# Patient Record
Sex: MALE | Race: WHITE | NOT HISPANIC OR LATINO
[De-identification: names, ages, dates, MRNs, and addresses within clinical notes are randomized per-mention and may not be internally consistent; named-entity substitution may affect disease eponyms.]

---

## 2017-03-15 ENCOUNTER — APPOINTMENT (OUTPATIENT)
Dept: CARDIOLOGY | Facility: CLINIC | Age: 47
End: 2017-03-15

## 2017-09-22 ENCOUNTER — RX RENEWAL (OUTPATIENT)
Age: 47
End: 2017-09-22

## 2017-11-07 ENCOUNTER — APPOINTMENT (OUTPATIENT)
Dept: FAMILY MEDICINE | Facility: CLINIC | Age: 47
End: 2017-11-07
Payer: COMMERCIAL

## 2017-11-07 VITALS
SYSTOLIC BLOOD PRESSURE: 140 MMHG | OXYGEN SATURATION: 99 % | WEIGHT: 190 LBS | RESPIRATION RATE: 18 BRPM | BODY MASS INDEX: 28.14 KG/M2 | DIASTOLIC BLOOD PRESSURE: 104 MMHG | HEIGHT: 69 IN | HEART RATE: 79 BPM | TEMPERATURE: 98.8 F

## 2017-11-07 DIAGNOSIS — Z23 ENCOUNTER FOR IMMUNIZATION: ICD-10-CM

## 2017-11-07 PROCEDURE — 36415 COLL VENOUS BLD VENIPUNCTURE: CPT

## 2017-11-07 PROCEDURE — G0008: CPT

## 2017-11-07 PROCEDURE — 90688 IIV4 VACCINE SPLT 0.5 ML IM: CPT

## 2017-11-07 PROCEDURE — 99214 OFFICE O/P EST MOD 30 MIN: CPT | Mod: 25

## 2017-11-08 LAB
ALBUMIN SERPL ELPH-MCNC: 4.6 G/DL
ALP BLD-CCNC: 61 U/L
ALT SERPL-CCNC: 29 U/L
ANION GAP SERPL CALC-SCNC: 15 MMOL/L
AST SERPL-CCNC: 17 U/L
BASOPHILS # BLD AUTO: 0.03 K/UL
BASOPHILS NFR BLD AUTO: 0.5 %
BILIRUB SERPL-MCNC: 1.4 MG/DL
BUN SERPL-MCNC: 15 MG/DL
CALCIUM SERPL-MCNC: 9.3 MG/DL
CHLORIDE SERPL-SCNC: 103 MMOL/L
CHOLEST SERPL-MCNC: 150 MG/DL
CHOLEST/HDLC SERPL: 3 RATIO
CO2 SERPL-SCNC: 24 MMOL/L
CREAT SERPL-MCNC: 1.03 MG/DL
EOSINOPHIL # BLD AUTO: 0.06 K/UL
EOSINOPHIL NFR BLD AUTO: 1 %
GLUCOSE SERPL-MCNC: 97 MG/DL
HBA1C MFR BLD HPLC: 4.2 %
HCT VFR BLD CALC: 44.6 %
HDLC SERPL-MCNC: 50 MG/DL
HGB BLD-MCNC: 16.1 G/DL
IMM GRANULOCYTES NFR BLD AUTO: 0.3 %
LDLC SERPL CALC-MCNC: 84 MG/DL
LYMPHOCYTES # BLD AUTO: 1.81 K/UL
LYMPHOCYTES NFR BLD AUTO: 30.4 %
MAN DIFF?: NORMAL
MCHC RBC-ENTMCNC: 31.8 PG
MCHC RBC-ENTMCNC: 36.1 GM/DL
MCV RBC AUTO: 88 FL
MONOCYTES # BLD AUTO: 0.41 K/UL
MONOCYTES NFR BLD AUTO: 6.9 %
NEUTROPHILS # BLD AUTO: 3.62 K/UL
NEUTROPHILS NFR BLD AUTO: 60.9 %
PLATELET # BLD AUTO: 241 K/UL
POTASSIUM SERPL-SCNC: 4.2 MMOL/L
PROT SERPL-MCNC: 7.2 G/DL
RBC # BLD: 5.07 M/UL
RBC # FLD: 13.8 %
SODIUM SERPL-SCNC: 142 MMOL/L
TRIGL SERPL-MCNC: 78 MG/DL
TSH SERPL-ACNC: 1.73 UIU/ML
WBC # FLD AUTO: 5.95 K/UL

## 2018-05-05 ENCOUNTER — RX RENEWAL (OUTPATIENT)
Age: 48
End: 2018-05-05

## 2018-07-03 ENCOUNTER — APPOINTMENT (OUTPATIENT)
Dept: FAMILY MEDICINE | Facility: CLINIC | Age: 48
End: 2018-07-03
Payer: COMMERCIAL

## 2018-07-03 VITALS
BODY MASS INDEX: 28.88 KG/M2 | DIASTOLIC BLOOD PRESSURE: 84 MMHG | HEIGHT: 69 IN | SYSTOLIC BLOOD PRESSURE: 130 MMHG | WEIGHT: 195 LBS

## 2018-07-03 PROCEDURE — 36415 COLL VENOUS BLD VENIPUNCTURE: CPT

## 2018-07-03 PROCEDURE — 99214 OFFICE O/P EST MOD 30 MIN: CPT | Mod: 25

## 2018-07-04 LAB
ALBUMIN SERPL ELPH-MCNC: 4.7 G/DL
ALP BLD-CCNC: 58 U/L
ALT SERPL-CCNC: 44 U/L
ANION GAP SERPL CALC-SCNC: 12 MMOL/L
AST SERPL-CCNC: 24 U/L
BILIRUB SERPL-MCNC: 1.4 MG/DL
BUN SERPL-MCNC: 16 MG/DL
CALCIUM SERPL-MCNC: 9.5 MG/DL
CHLORIDE SERPL-SCNC: 104 MMOL/L
CO2 SERPL-SCNC: 25 MMOL/L
CREAT SERPL-MCNC: 1.06 MG/DL
GLUCOSE SERPL-MCNC: 78 MG/DL
POTASSIUM SERPL-SCNC: 4.3 MMOL/L
PROT SERPL-MCNC: 7.2 G/DL
SODIUM SERPL-SCNC: 141 MMOL/L

## 2018-12-05 ENCOUNTER — APPOINTMENT (OUTPATIENT)
Dept: OTOLARYNGOLOGY | Facility: CLINIC | Age: 48
End: 2018-12-05
Payer: COMMERCIAL

## 2018-12-05 VITALS
HEIGHT: 69 IN | HEART RATE: 70 BPM | SYSTOLIC BLOOD PRESSURE: 152 MMHG | DIASTOLIC BLOOD PRESSURE: 101 MMHG | BODY MASS INDEX: 28.88 KG/M2 | WEIGHT: 195 LBS

## 2018-12-05 DIAGNOSIS — Z82.49 FAMILY HISTORY OF ISCHEMIC HEART DISEASE AND OTHER DISEASES OF THE CIRCULATORY SYSTEM: ICD-10-CM

## 2018-12-05 DIAGNOSIS — Z86.79 PERSONAL HISTORY OF OTHER DISEASES OF THE CIRCULATORY SYSTEM: ICD-10-CM

## 2018-12-05 DIAGNOSIS — Z80.9 FAMILY HISTORY OF MALIGNANT NEOPLASM, UNSPECIFIED: ICD-10-CM

## 2018-12-05 DIAGNOSIS — Z78.9 OTHER SPECIFIED HEALTH STATUS: ICD-10-CM

## 2018-12-05 DIAGNOSIS — J32.9 CHRONIC SINUSITIS, UNSPECIFIED: ICD-10-CM

## 2018-12-05 DIAGNOSIS — H93.12 TINNITUS, LEFT EAR: ICD-10-CM

## 2018-12-05 DIAGNOSIS — Z83.3 FAMILY HISTORY OF DIABETES MELLITUS: ICD-10-CM

## 2018-12-05 PROCEDURE — 99204 OFFICE O/P NEW MOD 45 MIN: CPT | Mod: 25,57

## 2018-12-10 ENCOUNTER — APPOINTMENT (OUTPATIENT)
Dept: OTOLARYNGOLOGY | Facility: CLINIC | Age: 48
End: 2018-12-10
Payer: COMMERCIAL

## 2018-12-10 VITALS
HEART RATE: 79 BPM | HEIGHT: 69 IN | SYSTOLIC BLOOD PRESSURE: 156 MMHG | WEIGHT: 195 LBS | DIASTOLIC BLOOD PRESSURE: 102 MMHG | BODY MASS INDEX: 28.88 KG/M2

## 2018-12-10 DIAGNOSIS — H91.92 UNSPECIFIED HEARING LOSS, LEFT EAR: ICD-10-CM

## 2018-12-10 PROCEDURE — 99214 OFFICE O/P EST MOD 30 MIN: CPT | Mod: 25

## 2018-12-10 PROCEDURE — 92550 TYMPANOMETRY & REFLEX THRESH: CPT

## 2018-12-10 PROCEDURE — 92557 COMPREHENSIVE HEARING TEST: CPT

## 2018-12-10 RX ORDER — PROMETHAZINE HYDROCHLORIDE AND DEXTROMETHORPHAN HYDROBROMIDE ORAL SOLUTION 15; 6.25 MG/5ML; MG/5ML
6.25-15 SOLUTION ORAL
Qty: 240 | Refills: 0 | Status: DISCONTINUED | COMMUNITY
Start: 2018-07-03 | End: 2018-12-10

## 2018-12-27 ENCOUNTER — MEDICATION RENEWAL (OUTPATIENT)
Age: 48
End: 2018-12-27

## 2018-12-31 ENCOUNTER — APPOINTMENT (OUTPATIENT)
Dept: FAMILY MEDICINE | Facility: CLINIC | Age: 48
End: 2018-12-31
Payer: COMMERCIAL

## 2018-12-31 VITALS
BODY MASS INDEX: 28.88 KG/M2 | OXYGEN SATURATION: 98 % | WEIGHT: 195 LBS | SYSTOLIC BLOOD PRESSURE: 140 MMHG | HEIGHT: 69 IN | HEART RATE: 67 BPM | RESPIRATION RATE: 16 BRPM | DIASTOLIC BLOOD PRESSURE: 90 MMHG

## 2018-12-31 DIAGNOSIS — I10 ESSENTIAL (PRIMARY) HYPERTENSION: ICD-10-CM

## 2018-12-31 DIAGNOSIS — Z00.00 ENCOUNTER FOR GENERAL ADULT MEDICAL EXAMINATION W/OUT ABNORMAL FINDINGS: ICD-10-CM

## 2018-12-31 DIAGNOSIS — R00.2 PALPITATIONS: ICD-10-CM

## 2018-12-31 PROCEDURE — 99213 OFFICE O/P EST LOW 20 MIN: CPT | Mod: 25

## 2018-12-31 PROCEDURE — 93000 ELECTROCARDIOGRAM COMPLETE: CPT

## 2018-12-31 PROCEDURE — 36415 COLL VENOUS BLD VENIPUNCTURE: CPT

## 2018-12-31 NOTE — PHYSICAL EXAM
[No Acute Distress] : no acute distress [Well Nourished] : well nourished [Well Developed] : well developed [Well-Appearing] : well-appearing [Normal Sclera/Conjunctiva] : normal sclera/conjunctiva [PERRL] : pupils equal round and reactive to light [EOMI] : extraocular movements intact [Normal Oropharynx] : the oropharynx was normal [No Respiratory Distress] : no respiratory distress  [Clear to Auscultation] : lungs were clear to auscultation bilaterally [No Accessory Muscle Use] : no accessory muscle use [Normal Rate] : normal rate  [Regular Rhythm] : with a regular rhythm [Normal S1, S2] : normal S1 and S2 [No Edema] : there was no peripheral edema [No Extremity Clubbing/Cyanosis] : no extremity clubbing/cyanosis [Soft] : abdomen soft [Non Tender] : non-tender [Non-distended] : non-distended [No Masses] : no abdominal mass palpated [Normal Bowel Sounds] : normal bowel sounds [Normal Affect] : the affect was normal [Alert and Oriented x3] : oriented to person, place, and time [Normal Mood] : the mood was normal [de-identified] : CN II-XII intact

## 2018-12-31 NOTE — HISTORY OF PRESENT ILLNESS
[FreeTextEntry1] : Here for follow-up  [de-identified] : Here for follow-up and bloodwork. \par Has been taking Losartan without any problems. \par \par Has been having headaches.  Relieved by Aspirin.  \par \par Averaging 5-6 hours of sleep.  Work schedule shifts frequently and sleep cycle changes. \par Also notes he has been having some episodes of palpitations at rest.  Not related to activity.  No chest pain or difficulty breathing. \par

## 2018-12-31 NOTE — PLAN
[FreeTextEntry1] : \par \par EKG reviewed-similar to prior.  Patient currently stable and asymptomatic.  Advised follow-up with cardiologist.  \par \par Asked patient to keep BP log at home, may require medication adjustment.  Elevated readings at specialist's office.\par Discussed normal BP levels.   Headache may be related to change in BP, low blood sugar, change in sleep habits. Currently stable, normal neuro exam.  Will follow-up labwork. Discussed with Mr. GONSALVES precautions and advised to go to Emergency Room if condition worsened, developed chest pain or shortness of breath.\par \par  SHIV GONSALVES expressed understanding of the plan.\par

## 2019-01-02 ENCOUNTER — OTHER (OUTPATIENT)
Age: 49
End: 2019-01-02

## 2019-01-03 LAB
ALBUMIN SERPL ELPH-MCNC: 4.5 G/DL
ALP BLD-CCNC: 68 U/L
ALT SERPL-CCNC: 45 U/L
ANION GAP SERPL CALC-SCNC: 13 MMOL/L
AST SERPL-CCNC: 20 U/L
BASOPHILS # BLD AUTO: 0.03 K/UL
BASOPHILS NFR BLD AUTO: 0.5 %
BILIRUB SERPL-MCNC: 0.9 MG/DL
BUN SERPL-MCNC: 14 MG/DL
CALCIUM SERPL-MCNC: 9.2 MG/DL
CHLORIDE SERPL-SCNC: 107 MMOL/L
CO2 SERPL-SCNC: 23 MMOL/L
CREAT SERPL-MCNC: 1.04 MG/DL
EOSINOPHIL # BLD AUTO: 0.09 K/UL
EOSINOPHIL NFR BLD AUTO: 1.4 %
GLUCOSE SERPL-MCNC: 86 MG/DL
HCT VFR BLD CALC: 43.2 %
HGB BLD-MCNC: 16 G/DL
IMM GRANULOCYTES NFR BLD AUTO: 0 %
LYMPHOCYTES # BLD AUTO: 2.06 K/UL
LYMPHOCYTES NFR BLD AUTO: 32.3 %
MAN DIFF?: NORMAL
MCHC RBC-ENTMCNC: 31.2 PG
MCHC RBC-ENTMCNC: 37 GM/DL
MCV RBC AUTO: 84.2 FL
MONOCYTES # BLD AUTO: 0.41 K/UL
MONOCYTES NFR BLD AUTO: 6.4 %
NEUTROPHILS # BLD AUTO: 3.79 K/UL
NEUTROPHILS NFR BLD AUTO: 59.4 %
PLATELET # BLD AUTO: 270 K/UL
POTASSIUM SERPL-SCNC: 4.4 MMOL/L
PROT SERPL-MCNC: 7.1 G/DL
RBC # BLD: 5.13 M/UL
RBC # FLD: 13.4 %
SODIUM SERPL-SCNC: 143 MMOL/L
TSH SERPL-ACNC: 2.53 UIU/ML
WBC # FLD AUTO: 6.38 K/UL

## 2019-02-04 ENCOUNTER — OUTPATIENT (OUTPATIENT)
Dept: OUTPATIENT SERVICES | Facility: HOSPITAL | Age: 49
LOS: 1 days | End: 2019-02-04
Payer: COMMERCIAL

## 2019-02-04 VITALS
DIASTOLIC BLOOD PRESSURE: 104 MMHG | HEIGHT: 68 IN | TEMPERATURE: 98 F | SYSTOLIC BLOOD PRESSURE: 150 MMHG | HEART RATE: 89 BPM | WEIGHT: 195.11 LBS | RESPIRATION RATE: 14 BRPM

## 2019-02-04 DIAGNOSIS — M71.9 BURSOPATHY, UNSPECIFIED: Chronic | ICD-10-CM

## 2019-02-04 DIAGNOSIS — H90.6 MIXED CONDUCTIVE AND SENSORINEURAL HEARING LOSS, BILATERAL: ICD-10-CM

## 2019-02-04 DIAGNOSIS — H90.8 MIXED CONDUCTIVE AND SENSORINEURAL HEARING LOSS, UNSPECIFIED: ICD-10-CM

## 2019-02-04 DIAGNOSIS — I10 ESSENTIAL (PRIMARY) HYPERTENSION: ICD-10-CM

## 2019-02-04 LAB
ANION GAP SERPL CALC-SCNC: 17 MMO/L — HIGH (ref 7–14)
BUN SERPL-MCNC: 18 MG/DL — SIGNIFICANT CHANGE UP (ref 7–23)
CALCIUM SERPL-MCNC: 9.5 MG/DL — SIGNIFICANT CHANGE UP (ref 8.4–10.5)
CHLORIDE SERPL-SCNC: 104 MMOL/L — SIGNIFICANT CHANGE UP (ref 98–107)
CO2 SERPL-SCNC: 22 MMOL/L — SIGNIFICANT CHANGE UP (ref 22–31)
CREAT SERPL-MCNC: 1.09 MG/DL — SIGNIFICANT CHANGE UP (ref 0.5–1.3)
GLUCOSE SERPL-MCNC: 58 MG/DL — LOW (ref 70–99)
HCT VFR BLD CALC: 42.1 % — SIGNIFICANT CHANGE UP (ref 39–50)
HGB BLD-MCNC: 14 G/DL — SIGNIFICANT CHANGE UP (ref 13–17)
MCHC RBC-ENTMCNC: 28.6 PG — SIGNIFICANT CHANGE UP (ref 27–34)
MCHC RBC-ENTMCNC: 33.2 % — SIGNIFICANT CHANGE UP (ref 32–36)
MCV RBC AUTO: 85.4 FL — SIGNIFICANT CHANGE UP (ref 80–100)
NRBC # FLD: 0 K/UL — LOW (ref 25–125)
PLATELET # BLD AUTO: 280 K/UL — SIGNIFICANT CHANGE UP (ref 150–400)
PMV BLD: 10.9 FL — SIGNIFICANT CHANGE UP (ref 7–13)
POTASSIUM SERPL-MCNC: 3.6 MMOL/L — SIGNIFICANT CHANGE UP (ref 3.5–5.3)
POTASSIUM SERPL-SCNC: 3.6 MMOL/L — SIGNIFICANT CHANGE UP (ref 3.5–5.3)
RBC # BLD: 4.93 M/UL — SIGNIFICANT CHANGE UP (ref 4.2–5.8)
RBC # FLD: 12.7 % — SIGNIFICANT CHANGE UP (ref 10.3–14.5)
SODIUM SERPL-SCNC: 143 MMOL/L — SIGNIFICANT CHANGE UP (ref 135–145)
WBC # BLD: 9.04 K/UL — SIGNIFICANT CHANGE UP (ref 3.8–10.5)
WBC # FLD AUTO: 9.04 K/UL — SIGNIFICANT CHANGE UP (ref 3.8–10.5)

## 2019-02-04 PROCEDURE — 93010 ELECTROCARDIOGRAM REPORT: CPT

## 2019-02-04 RX ORDER — LOSARTAN POTASSIUM 100 MG/1
1 TABLET, FILM COATED ORAL
Qty: 0 | Refills: 0 | COMMUNITY

## 2019-02-04 RX ORDER — AMLODIPINE AND VALSARTAN 5; 320 MG/1; MG/1
1 TABLET, FILM COATED ORAL
Qty: 0 | Refills: 0 | COMMUNITY

## 2019-02-04 NOTE — H&P PST ADULT - CARDIOVASCULAR COMMENTS
hx of palpitations since 2017 not associated with activity, last episode 1 months ago, lasting a second was evaluated by cardiology reports no change in symptoms

## 2019-02-04 NOTE — H&P PST ADULT - GASTROINTESTINAL DETAILS
no guarding/no masses palpable/no bruit/no distention/soft/bowel sounds normal/no rebound tenderness/nontender/no rigidity/no organomegaly

## 2019-02-04 NOTE — H&P PST ADULT - CARDIOVASCULAR SYMPTOMS
palpitations palpitations/when seen by cardiologist 1/7/2019 medications were changed from losartan 100mg daily to amlodipine- valsartan 5/320mg, was using up losartan before starting amlodipine- valsartan  will start new medication today

## 2019-02-04 NOTE — H&P PST ADULT - NEGATIVE CARDIOVASCULAR SYMPTOMS
no paroxysmal nocturnal dyspnea/no claudication/no peripheral edema/no dyspnea on exertion/no chest pain/no orthopnea

## 2019-02-04 NOTE — H&P PST ADULT - HISTORY OF PRESENT ILLNESS
50y/o male scheduled for left stapedectomy with fascia graft and facial nerve monitoring on 2/14/2019.  Pt states, "has left ear hearing loss for the past few yrs, worse over the past year.  Denies injury, possibly hereditary."

## 2019-02-04 NOTE — H&P PST ADULT - RS GEN PE MLT RESP DETAILS PC
no intercostal retractions/no rales/no chest wall tenderness/respirations non-labored/breath sounds equal/good air movement/no wheezes/clear to auscultation bilaterally/no rhonchi

## 2019-02-04 NOTE — H&P PST ADULT - NEGATIVE GENERAL GENITOURINARY SYMPTOMS
no bladder infections/no dysuria/no flank pain L/no flank pain R/no urinary hesitancy/normal urinary frequency/no hematuria

## 2019-02-04 NOTE — H&P PST ADULT - PROBLEM SELECTOR PLAN 2
bp 150/104, was prescribed amlodipine- valsartan 5/320mg on 1/7/2019, was waiting to finish losartan 100mg,  last dose of losartan 2/3/2019, will start amlodipine- valsartan today.  abnormal ekg.  Pt is changing pmd to be Dr. Lucia who will than be pmd/ cardiologist.  Instructed to obtain cardiology evaluation.

## 2019-02-04 NOTE — H&P PST ADULT - NEGATIVE GENERAL SYMPTOMS
no weight gain/no polyphagia/no fever/no polyuria/no malaise/no polydipsia/no fatigue/no weight loss/no chills/no anorexia/no sweating

## 2019-02-04 NOTE — H&P PST ADULT - PROBLEM SELECTOR PLAN 1
Pt scheduled for left stapedectomy with fascia graft and facial nerve monitoring on 2/14/2019.  labs done results pending, ekg done.  Preop teaching done, pt able to verbalize understanding.

## 2019-02-06 ENCOUNTER — APPOINTMENT (OUTPATIENT)
Dept: OTOLARYNGOLOGY | Facility: CLINIC | Age: 49
End: 2019-02-06

## 2019-02-12 ENCOUNTER — TRANSCRIPTION ENCOUNTER (OUTPATIENT)
Age: 49
End: 2019-02-12

## 2019-02-13 ENCOUNTER — APPOINTMENT (OUTPATIENT)
Dept: OTOLARYNGOLOGY | Facility: HOSPITAL | Age: 49
End: 2019-02-13

## 2019-02-13 ENCOUNTER — OUTPATIENT (OUTPATIENT)
Dept: OUTPATIENT SERVICES | Facility: HOSPITAL | Age: 49
LOS: 1 days | Discharge: HOME | End: 2019-02-13
Payer: COMMERCIAL

## 2019-02-13 ENCOUNTER — RESULT REVIEW (OUTPATIENT)
Age: 49
End: 2019-02-13

## 2019-02-13 VITALS
WEIGHT: 195.11 LBS | OXYGEN SATURATION: 100 % | DIASTOLIC BLOOD PRESSURE: 83 MMHG | SYSTOLIC BLOOD PRESSURE: 131 MMHG | HEIGHT: 68 IN | RESPIRATION RATE: 18 BRPM | HEART RATE: 45 BPM | TEMPERATURE: 97 F

## 2019-02-13 VITALS
SYSTOLIC BLOOD PRESSURE: 91 MMHG | DIASTOLIC BLOOD PRESSURE: 52 MMHG | HEART RATE: 72 BPM | OXYGEN SATURATION: 97 % | RESPIRATION RATE: 14 BRPM

## 2019-02-13 DIAGNOSIS — H90.6 MIXED CONDUCTIVE AND SENSORINEURAL HEARING LOSS, BILATERAL: ICD-10-CM

## 2019-02-13 DIAGNOSIS — M71.9 BURSOPATHY, UNSPECIFIED: Chronic | ICD-10-CM

## 2019-02-13 PROBLEM — I10 ESSENTIAL (PRIMARY) HYPERTENSION: Chronic | Status: ACTIVE | Noted: 2019-02-04

## 2019-02-13 PROBLEM — H90.8 MIXED CONDUCTIVE AND SENSORINEURAL HEARING LOSS, UNSPECIFIED: Chronic | Status: ACTIVE | Noted: 2019-02-04

## 2019-02-13 PROCEDURE — 88304 TISSUE EXAM BY PATHOLOGIST: CPT | Mod: 26

## 2019-02-13 PROCEDURE — 88311 DECALCIFY TISSUE: CPT | Mod: 26

## 2019-02-13 PROCEDURE — 69660 REVISE MIDDLE EAR BONE: CPT | Mod: LT

## 2019-02-13 PROCEDURE — 20926: CPT

## 2019-02-13 RX ORDER — ACETAMINOPHEN WITH CODEINE 300MG-30MG
1 TABLET ORAL
Qty: 20 | Refills: 0 | OUTPATIENT
Start: 2019-02-13

## 2019-02-13 RX ORDER — CIPROFLOXACIN AND DEXAMETHASONE 3; 1 MG/ML; MG/ML
2 SUSPENSION/ DROPS AURICULAR (OTIC)
Qty: 1 | Refills: 0 | OUTPATIENT
Start: 2019-02-13

## 2019-02-13 RX ORDER — CEFDINIR 250 MG/5ML
1 POWDER, FOR SUSPENSION ORAL
Qty: 14 | Refills: 0 | OUTPATIENT
Start: 2019-02-13 | End: 2019-02-19

## 2019-02-13 NOTE — ASU DISCHARGE PLAN (ADULT/PEDIATRIC). - NOTIFY
Pain not relieved by Medications/Inability to Tolerate Liquids or Foods/Bleeding that does not stop/Swelling that continues/Fever greater than 101/Unable to Urinate

## 2019-02-13 NOTE — ASU DISCHARGE PLAN (ADULT/PEDIATRIC). - MEDICATION SUMMARY - MEDICATIONS TO TAKE
I will START or STAY ON the medications listed below when I get home from the hospital:    Tylenol with Codeine #3 oral tablet  -- 1 tab(s) by mouth every 6 hours, As Needed -for severe pain MDD:6   -- Caution federal law prohibits the transfer of this drug to any person other  than the person for whom it was prescribed.  May cause drowsiness.  Alcohol may intensify this effect.  Use care when operating dangerous machinery.  This product contains acetaminophen.  Do not use  with any other product containing acetaminophen to prevent possible liver damage.  Using more of this medication than prescribed may cause serious breathing problems.    -- Indication: For take as prescribed and as needed    losartan 100 mg oral tablet  -- 1 tab(s) by mouth once a day (in the evening) , last dose 2/3/2019  -- Indication: For home med    amlodipine-valsartan 5 mg-320 mg oral tablet  -- 1 tab(s) by mouth once a day (in the evening), to start 2/4/2019  -- Indication: For home med    cefdinir 300 mg oral capsule  -- 1 cap(s) by mouth 2 times a day   -- Finish all this medication unless otherwise directed by prescriber.    -- Indication: For take as prescribed    Ciprodex 0.3%-0.1% otic suspension  -- 2 drop(s) in the left ear 2 times a day   -- For the ear.  Shake well before use.    -- Indication: For take as prescribed. start 1 week after surgery. can continue for 3-4 weeks

## 2019-02-17 ENCOUNTER — RX RENEWAL (OUTPATIENT)
Age: 49
End: 2019-02-17

## 2019-02-19 LAB — SURGICAL PATHOLOGY STUDY: SIGNIFICANT CHANGE UP

## 2019-02-25 ENCOUNTER — APPOINTMENT (OUTPATIENT)
Dept: OTOLARYNGOLOGY | Facility: CLINIC | Age: 49
End: 2019-02-25
Payer: COMMERCIAL

## 2019-02-25 PROCEDURE — 99024 POSTOP FOLLOW-UP VISIT: CPT

## 2019-02-25 RX ORDER — CIPROFLOXACIN AND DEXAMETHASONE 3; 1 MG/ML; MG/ML
0.3-0.1 SUSPENSION/ DROPS AURICULAR (OTIC) TWICE DAILY
Qty: 2 | Refills: 3 | Status: ACTIVE | COMMUNITY
Start: 2019-02-25 | End: 1900-01-01

## 2019-03-11 ENCOUNTER — APPOINTMENT (OUTPATIENT)
Dept: OTOLARYNGOLOGY | Facility: CLINIC | Age: 49
End: 2019-03-11
Payer: COMMERCIAL

## 2019-03-11 VITALS
BODY MASS INDEX: 28.14 KG/M2 | HEIGHT: 69 IN | DIASTOLIC BLOOD PRESSURE: 83 MMHG | HEART RATE: 78 BPM | SYSTOLIC BLOOD PRESSURE: 132 MMHG | WEIGHT: 190 LBS

## 2019-03-11 DIAGNOSIS — H80.02 OTOSCLEROSIS INVOLVING OVAL WINDOW, NONOBLITERATIVE, LEFT EAR: ICD-10-CM

## 2019-03-11 DIAGNOSIS — H90.6 MIXED CONDUCTIVE AND SENSORINEURAL HEARING LOSS, BILATERAL: ICD-10-CM

## 2019-03-11 PROCEDURE — 99024 POSTOP FOLLOW-UP VISIT: CPT

## 2019-03-11 PROCEDURE — 92557 COMPREHENSIVE HEARING TEST: CPT

## 2019-03-11 RX ORDER — AMLODIPINE AND OLMESARTAN MEDOXOMIL 10; 40 MG/1; MG/1
TABLET ORAL
Refills: 0 | Status: ACTIVE | COMMUNITY

## 2019-04-05 PROBLEM — H80.02: Status: ACTIVE | Noted: 2018-12-05

## 2019-04-05 PROBLEM — H90.6 HEARING LOSS, MIXED, BILATERAL: Status: ACTIVE | Noted: 2018-12-10

## 2019-04-05 NOTE — REASON FOR VISIT
[Subsequent Evaluation] : a subsequent evaluation for [FreeTextEntry2] : f/u for s/p Left argon laser stapedectomy with tragal perichondrial graft on 2/13/19

## 2019-04-05 NOTE — HISTORY OF PRESENT ILLNESS
[de-identified] : 50 y/o male f/iu for s/p Left argon laser stapedectomy with tragal perichondrial graft on 2/13/19.  Pt denies otalgia, otorrhea, ear infections, hearing loss, tinnitus, dizziness, vertigo or headaches related to hearing. \par \par \par

## 2019-04-05 NOTE — CONSULT LETTER
[Dear  ___] : Dear ~ROBYN, [Courtesy Letter:] : I had the pleasure of seeing your patient, [unfilled], in my office today. [Please see my note below.] : Please see my note below. [Consult Closing:] : Thank you very much for allowing me to participate in the care of this patient.  If you have any questions, please do not hesitate to contact me. [Sincerely,] : Sincerely, [FreeTextEntry2] : Frank Morales MD [FreeTextEntry3] : Hipolito Kim MD, FACS\par Chair of Otolaryngology, [facility]\par Professor of Otolaryngology & Molecular Medicine, [employer] at [facility]\par \par

## 2019-07-15 ENCOUNTER — APPOINTMENT (OUTPATIENT)
Dept: OTOLARYNGOLOGY | Facility: CLINIC | Age: 49
End: 2019-07-15

## 2021-10-03 DIAGNOSIS — Z01.818 ENCOUNTER FOR OTHER PREPROCEDURAL EXAMINATION: ICD-10-CM

## 2021-10-04 ENCOUNTER — APPOINTMENT (OUTPATIENT)
Dept: DISASTER EMERGENCY | Facility: CLINIC | Age: 51
End: 2021-10-04

## 2021-10-05 LAB — SARS-COV-2 N GENE NPH QL NAA+PROBE: NOT DETECTED

## 2022-05-03 ENCOUNTER — APPOINTMENT (OUTPATIENT)
Dept: ORTHOPEDIC SURGERY | Facility: CLINIC | Age: 52
End: 2022-05-03
Payer: COMMERCIAL

## 2022-05-03 VITALS — HEIGHT: 69 IN | WEIGHT: 190 LBS | BODY MASS INDEX: 28.14 KG/M2

## 2022-05-03 DIAGNOSIS — M54.16 RADICULOPATHY, LUMBAR REGION: ICD-10-CM

## 2022-05-03 DIAGNOSIS — I10 ESSENTIAL (PRIMARY) HYPERTENSION: ICD-10-CM

## 2022-05-03 PROCEDURE — 72110 X-RAY EXAM L-2 SPINE 4/>VWS: CPT

## 2022-05-03 PROCEDURE — 99214 OFFICE O/P EST MOD 30 MIN: CPT

## 2022-05-03 NOTE — IMAGING
[de-identified] : General:\par Strength: \par Hip Flexors: 5/5 b/l\par Quadriceps: 5/5 b/l\par Dorsi/Plantor flexion: 5/5 b/l\par EHL: 5/5 b/l\par \par Sensation: Intact b/l\par Straight Leg Raise: Negative b/l\par

## 2022-05-03 NOTE — ASSESSMENT
[FreeTextEntry1] : 53 y/o M with low back pain and numbness to left foot;  has not responded over the years to PT nor more recently a HEP for his core;  no overt weakness;  \par \par -MRI Lspine, r/o HNP or nerve root compression\par -RTO when MRI completed

## 2022-05-03 NOTE — HISTORY OF PRESENT ILLNESS
[Gradual] : gradual [5] : 5 [8] : 8 [Dull/Aching] : dull/aching [Constant] : constant [Walking/activity] : walking/activity [Lying in bed] : lying in bed [Full time] : Work status: full time [de-identified] : 5/3/22: 51 y/o M with lower back pain X many years. Reports tinging and numbness in the left foot. States has stiffness prior to a bowel movement and relieves after the BM. States it is exacerbated after activities. No specific injuries. Not taking anything for pain. Has had TPI, facet injections, PT in the past. Last MRI of Lspine was 2005. \par \par Occupation:  [] : no [FreeTextEntry5] : pt states he has been experiencing pain on his lower  left side of his back for years  [FreeTextEntry7] : left hip [de-identified] : rest

## 2022-05-04 ENCOUNTER — FORM ENCOUNTER (OUTPATIENT)
Age: 52
End: 2022-05-04

## 2022-05-05 ENCOUNTER — APPOINTMENT (OUTPATIENT)
Dept: MRI IMAGING | Facility: CLINIC | Age: 52
End: 2022-05-05
Payer: COMMERCIAL

## 2022-05-05 ENCOUNTER — APPOINTMENT (OUTPATIENT)
Dept: MRI IMAGING | Facility: CLINIC | Age: 52
End: 2022-05-05

## 2022-05-05 PROCEDURE — 72148 MRI LUMBAR SPINE W/O DYE: CPT

## 2022-05-09 ENCOUNTER — APPOINTMENT (OUTPATIENT)
Dept: ORTHOPEDIC SURGERY | Facility: CLINIC | Age: 52
End: 2022-05-09
Payer: COMMERCIAL

## 2022-05-09 VITALS — BODY MASS INDEX: 28.14 KG/M2 | WEIGHT: 190 LBS | HEIGHT: 69 IN

## 2022-05-09 DIAGNOSIS — M25.811 OTHER SPECIFIED JOINT DISORDERS, RIGHT SHOULDER: ICD-10-CM

## 2022-05-09 DIAGNOSIS — M67.813 OTHER SPECIFIED DISORDERS OF TENDON, RIGHT SHOULDER: ICD-10-CM

## 2022-05-09 DIAGNOSIS — M75.41 IMPINGEMENT SYNDROME OF RIGHT SHOULDER: ICD-10-CM

## 2022-05-09 DIAGNOSIS — M25.511 PAIN IN RIGHT SHOULDER: ICD-10-CM

## 2022-05-09 PROCEDURE — 99214 OFFICE O/P EST MOD 30 MIN: CPT

## 2022-05-09 NOTE — PHYSICAL EXAM
[Right] : right shoulder [Left] : left shoulder [Sitting] : sitting [Moderate] : moderate [Mild] : mild [] : no ecchymosis [de-identified] : There is decent strength. [TWNoteComboBox4] : passive forward flexion 160 degrees [TWNoteComboBox6] : internal rotation L1 [de-identified] : external rotation 60 degrees

## 2022-05-09 NOTE — ASSESSMENT
[FreeTextEntry1] : We reviewed his course.\par An MRI is deferred.\par RTW outlined.\par Questions answered.

## 2022-05-09 NOTE — HISTORY OF PRESENT ILLNESS
[7] : 7 [0] : 0 [de-identified] : pt is here today for a follow up of the right shoulder. pt states his pain level has stayed the same since last visit [FreeTextEntry1] : right shoulder  [de-identified] : stopped physical therapy a few weeks ago, but does home exercises

## 2022-05-09 NOTE — REASON FOR VISIT
[FreeTextEntry2] : This is a 52 year old [employer] M [employer] with right shoulder pain that has been progressive since before 2018.  He had surgery on the left shoulder by Dr. Middleton in the late 1990s for a bone spur and bursitis. \par \par DOS: 10/7/2021\par Procedure: Right Shoulder Arthroscopy, Glenohumeral Debridement, Posterior Capsule Lysis of Adhesions, Subacromial Decompression\par Diagnosis: Posterior Capsule Contracture, Subacromial Impingement, Shoulder Pain, SLAP Tear, Glenohumeral Chondrosis\par \par He continues to stretch daily.  The therapy hasn't helped much recently.  He can still have night pain.  No recent injury.

## 2022-05-19 ENCOUNTER — APPOINTMENT (OUTPATIENT)
Dept: ORTHOPEDIC SURGERY | Facility: CLINIC | Age: 52
End: 2022-05-19
Payer: COMMERCIAL

## 2022-05-19 DIAGNOSIS — M54.50 LOW BACK PAIN, UNSPECIFIED: ICD-10-CM

## 2022-05-19 PROCEDURE — 99213 OFFICE O/P EST LOW 20 MIN: CPT

## 2022-05-19 NOTE — HISTORY OF PRESENT ILLNESS
[Lower back] : lower back [Gradual] : gradual [5] : 5 [8] : 8 [Constant] : constant [Walking/activity] : walking/activity [Lying in bed] : lying in bed [Full time] : Work status: full time [] : no [FreeTextEntry7] : right hip [de-identified] : rest

## 2022-05-19 NOTE — ASSESSMENT
[FreeTextEntry1] : exam and symptoms essentially unchanged;  MRI without overt pathology;  in fact there is no discrete pathology ;  the pain is disproportionate if there is a mechanical disorder;  so I think there may be a inflammatory disorder and a rheumatology work up will be in order, at the diretion of his PMD

## 2022-09-10 NOTE — H&P PST ADULT - VENOUS THROMBOEMBOLISM CURRENT LABS/TEST RESULTS
Render Note In Bullet Format When Appropriate: No Show Applicator Variable?: Yes Medical Necessity Information: It is in your best interest to select a reason for this procedure from the list below. All of these items fulfill various CMS LCD requirements except the new and changing color options. Post-Care Instructions: I reviewed with the patient in detail post-care instructions. Patient is to wear sunprotection, and avoid picking at any of the treated lesions. Pt may apply Vaseline to crusted or scabbing areas. Spray Paint Text: The liquid nitrogen was applied to the skin utilizing a spray paint frosting technique. Medical Necessity Clause: This procedure was medically necessary because the lesions that were treated were: Pared With?: 15 blade Duration Of Freeze Thaw-Cycle (Seconds): 15-20 Number Of Freeze-Thaw Cycles: 3 freeze-thaw cycles Detail Level: Detailed Consent: The patient's consent was obtained including but not limited to risks of crusting, scabbing, blistering, scarring, darker or lighter pigmentary change, recurrence, incomplete removal and infection. none

## 2022-10-03 ENCOUNTER — RESULT REVIEW (OUTPATIENT)
Age: 52
End: 2022-10-03

## 2023-10-03 NOTE — H&P PST ADULT - NS PRO PASSIVE SMOKE EXP
DISCHARGE LETTER for Akilah Vigil [facility] (certified and regular) placed in outgoing mail on 10/03/23.     Article #:  [id number]    Address:  [address] [place], [place] [id number] No

## 2023-11-03 ENCOUNTER — APPOINTMENT (OUTPATIENT)
Dept: ORTHOPEDIC SURGERY | Facility: CLINIC | Age: 53
End: 2023-11-03
Payer: COMMERCIAL

## 2023-11-03 VITALS — BODY MASS INDEX: 28.88 KG/M2 | WEIGHT: 195 LBS | HEIGHT: 69 IN

## 2023-11-03 DIAGNOSIS — M75.42 IMPINGEMENT SYNDROME OF LEFT SHOULDER: ICD-10-CM

## 2023-11-03 PROCEDURE — 99214 OFFICE O/P EST MOD 30 MIN: CPT | Mod: 25

## 2023-11-03 PROCEDURE — 73030 X-RAY EXAM OF SHOULDER: CPT | Mod: LT

## 2023-11-03 PROCEDURE — 20611 DRAIN/INJ JOINT/BURSA W/US: CPT | Mod: LT

## 2023-11-03 PROCEDURE — 73010 X-RAY EXAM OF SHOULDER BLADE: CPT | Mod: LT

## 2024-12-24 PROBLEM — F10.90 ALCOHOL USE: Status: ACTIVE | Noted: 2017-11-07

## 2025-05-09 ENCOUNTER — APPOINTMENT (OUTPATIENT)
Dept: ORTHOPEDIC SURGERY | Facility: CLINIC | Age: 55
End: 2025-05-09

## 2025-05-09 VITALS — WEIGHT: 195 LBS | BODY MASS INDEX: 28.88 KG/M2 | HEIGHT: 69 IN

## 2025-05-09 DIAGNOSIS — M75.42 IMPINGEMENT SYNDROME OF LEFT SHOULDER: ICD-10-CM

## 2025-05-09 PROCEDURE — 73030 X-RAY EXAM OF SHOULDER: CPT | Mod: LT

## 2025-05-09 PROCEDURE — 73010 X-RAY EXAM OF SHOULDER BLADE: CPT | Mod: LT

## 2025-05-09 PROCEDURE — 99214 OFFICE O/P EST MOD 30 MIN: CPT

## 2025-05-19 ENCOUNTER — APPOINTMENT (OUTPATIENT)
Dept: ORTHOPEDIC SURGERY | Facility: CLINIC | Age: 55
End: 2025-05-19
Payer: COMMERCIAL

## 2025-05-19 VITALS — BODY MASS INDEX: 28.88 KG/M2 | HEIGHT: 69 IN | WEIGHT: 195 LBS

## 2025-05-19 PROCEDURE — 99214 OFFICE O/P EST MOD 30 MIN: CPT

## 2025-05-19 RX ORDER — DICLOFENAC SODIUM 75 MG/1
75 TABLET, DELAYED RELEASE ORAL
Qty: 60 | Refills: 0 | Status: ACTIVE | COMMUNITY
Start: 2025-05-19 | End: 1900-01-01

## 2025-05-22 ENCOUNTER — APPOINTMENT (OUTPATIENT)
Dept: MRI IMAGING | Facility: CLINIC | Age: 55
End: 2025-05-22
Payer: COMMERCIAL

## 2025-05-22 PROCEDURE — 73221 MRI JOINT UPR EXTREM W/O DYE: CPT | Mod: LT

## 2025-05-30 ENCOUNTER — APPOINTMENT (OUTPATIENT)
Dept: ORTHOPEDIC SURGERY | Facility: CLINIC | Age: 55
End: 2025-05-30
Payer: COMMERCIAL

## 2025-05-30 VITALS — WEIGHT: 195 LBS | HEIGHT: 69 IN | BODY MASS INDEX: 28.88 KG/M2

## 2025-05-30 DIAGNOSIS — M75.42 IMPINGEMENT SYNDROME OF LEFT SHOULDER: ICD-10-CM

## 2025-05-30 PROCEDURE — 99214 OFFICE O/P EST MOD 30 MIN: CPT

## 2025-05-30 RX ORDER — METHYLPREDNISOLONE 4 MG/1
4 TABLET ORAL
Qty: 1 | Refills: 0 | Status: ACTIVE | COMMUNITY
Start: 2025-05-30 | End: 1900-01-01

## 2025-06-04 ENCOUNTER — APPOINTMENT (OUTPATIENT)
Dept: SPEECH THERAPY | Facility: CLINIC | Age: 55
End: 2025-06-04

## 2025-06-05 ENCOUNTER — APPOINTMENT (OUTPATIENT)
Dept: ORTHOPEDIC SURGERY | Facility: CLINIC | Age: 55
End: 2025-06-05

## 2025-06-05 PROCEDURE — 73030 X-RAY EXAM OF SHOULDER: CPT | Mod: LT

## 2025-06-05 PROCEDURE — 99204 OFFICE O/P NEW MOD 45 MIN: CPT

## 2025-06-20 ENCOUNTER — APPOINTMENT (OUTPATIENT)
Dept: ORTHOPEDIC SURGERY | Facility: CLINIC | Age: 55
End: 2025-06-20
Payer: COMMERCIAL

## 2025-06-20 PROCEDURE — 99214 OFFICE O/P EST MOD 30 MIN: CPT

## 2025-06-25 ENCOUNTER — APPOINTMENT (OUTPATIENT)
Dept: ORTHOPEDIC SURGERY | Facility: CLINIC | Age: 55
End: 2025-06-25

## 2025-06-25 PROCEDURE — 99214 OFFICE O/P EST MOD 30 MIN: CPT | Mod: 95

## 2025-06-27 PROBLEM — M89.9 BONE LESION: Status: ACTIVE | Noted: 2025-06-27

## 2025-06-27 PROBLEM — M25.612 STIFFNESS OF LEFT SHOULDER JOINT: Status: ACTIVE | Noted: 2025-06-20

## 2025-07-08 ENCOUNTER — OUTPATIENT (OUTPATIENT)
Dept: OUTPATIENT SERVICES | Facility: HOSPITAL | Age: 55
LOS: 1 days | End: 2025-07-08

## 2025-07-08 VITALS
DIASTOLIC BLOOD PRESSURE: 78 MMHG | HEIGHT: 69 IN | TEMPERATURE: 98 F | SYSTOLIC BLOOD PRESSURE: 124 MMHG | WEIGHT: 190.04 LBS | OXYGEN SATURATION: 97 % | HEART RATE: 70 BPM | RESPIRATION RATE: 14 BRPM

## 2025-07-08 DIAGNOSIS — M89.9 DISORDER OF BONE, UNSPECIFIED: ICD-10-CM

## 2025-07-08 DIAGNOSIS — Z90.09 ACQUIRED ABSENCE OF OTHER PART OF HEAD AND NECK: Chronic | ICD-10-CM

## 2025-07-08 DIAGNOSIS — M71.9 BURSOPATHY, UNSPECIFIED: Chronic | ICD-10-CM

## 2025-07-08 RX ORDER — SODIUM CHLORIDE 9 G/1000ML
1000 INJECTION, SOLUTION INTRAVENOUS
Refills: 0 | Status: DISCONTINUED | OUTPATIENT
Start: 2025-07-16 | End: 2025-07-30

## 2025-07-08 NOTE — H&P PST ADULT - LYMPHATICS COMMENTS
no palpable lymph nodes Bilobed Flap Text: The defect edges were debeveled with a #15 scalpel blade.  Given the location of the defect and the proximity to free margins a bilobe flap was deemed most appropriate.  Using a sterile surgical marker, an appropriate bilobe flap drawn around the defect.    The area thus outlined was incised deep to adipose tissue with a #15 scalpel blade.  The skin margins were undermined to an appropriate distance in all directions utilizing iris scissors.

## 2025-07-08 NOTE — H&P PST ADULT - MUSCULOSKELETAL
details… ROM intact strength 5/5 bilateral upper extremities/strength 5/5 bilateral lower extremities

## 2025-07-08 NOTE — H&P PST ADULT - GASTROINTESTINAL
details… soft/nontender/nondistended/normal active bowel sounds/no guarding/no rigidity/no organomegaly/no palpable amandeep

## 2025-07-08 NOTE — H&P PST ADULT - ATTENDING COMMENTS
I have reviewed and agree with note as written above  for curettage, bonegraft left proximal humerus lesion.  Risks, benefits and alternatives discussed with patient.  Tony Mcclure MD  Musculoskeletal Oncology  [phone]

## 2025-07-08 NOTE — H&P PST ADULT - PROBLEM SELECTOR PLAN 1
Pt scheduled for left proximal humerus curettage bone grafting on 7/16/2025.  Chlorhexidine provided-  written and verbal instructions given, with teach back, pt able to verbalize understanding.  Preop teaching done, pt able to verbalize understanding.   medication day of procedure none

## 2025-07-08 NOTE — H&P PST ADULT - NSICDXPASTSURGICALHX_GEN_ALL_CORE_FT
PAST SURGICAL HISTORY:  Bursitis left shoulder 1997     PAST SURGICAL HISTORY:  Bursitis left shoulder 1997    H/O stapedectomy

## 2025-07-08 NOTE — H&P PST ADULT - BP NONINVASIVE SYSTOLIC (MM HG)
Detail Level: Zone Render In Strict Bullet Format?: No Continue Regimen: Clobetasol cream Continue Regimen: Ammonium lactate lotion 124

## 2025-07-08 NOTE — H&P PST ADULT - HISTORY OF PRESENT ILLNESS
56y/o male scheduled for left proximal humerus curettage bone grafting on 7/16/2025.  Pt left shoulder pain for over a year, was informed needed a decompression, underwent MRI for further evaluation, showed cartilage lesion.

## 2025-07-08 NOTE — H&P PST ADULT - NSICDXPASTMEDICALHX_GEN_ALL_CORE_FT
PAST MEDICAL HISTORY:  Hypertension     Mixed conductive and sensorineural hearing loss      PAST MEDICAL HISTORY:  Hypertension     Mixed conductive and sensorineural hearing loss     Other disorder of bone     Sleep apnea

## 2025-07-09 ENCOUNTER — APPOINTMENT (OUTPATIENT)
Dept: OTOLARYNGOLOGY | Facility: CLINIC | Age: 55
End: 2025-07-09
Payer: COMMERCIAL

## 2025-07-09 PROCEDURE — 99203 OFFICE O/P NEW LOW 30 MIN: CPT

## 2025-07-09 RX ORDER — CIPROFLOXACIN AND DEXAMETHASONE 3; 1 MG/ML; MG/ML
0.3-0.1 SUSPENSION/ DROPS AURICULAR (OTIC)
Qty: 1 | Refills: 1 | Status: ACTIVE | COMMUNITY
Start: 2025-07-09 | End: 1900-01-01

## 2025-07-14 PROBLEM — H90.2 CONDUCTIVE HEARING LOSS: Status: ACTIVE | Noted: 2025-07-14

## 2025-07-15 NOTE — ASU PATIENT PROFILE, ADULT - NSICDXPASTMEDICALHX_GEN_ALL_CORE_FT
PAST MEDICAL HISTORY:  Hypertension     Mixed conductive and sensorineural hearing loss     Other disorder of bone     Sleep apnea

## 2025-07-16 ENCOUNTER — TRANSCRIPTION ENCOUNTER (OUTPATIENT)
Age: 55
End: 2025-07-16

## 2025-07-16 ENCOUNTER — RESULT REVIEW (OUTPATIENT)
Age: 55
End: 2025-07-16

## 2025-07-16 ENCOUNTER — OUTPATIENT (OUTPATIENT)
Dept: INPATIENT UNIT | Facility: HOSPITAL | Age: 55
LOS: 1 days | End: 2025-07-16
Payer: COMMERCIAL

## 2025-07-16 ENCOUNTER — APPOINTMENT (OUTPATIENT)
Dept: ORTHOPEDIC SURGERY | Facility: HOSPITAL | Age: 55
End: 2025-07-16

## 2025-07-16 VITALS
TEMPERATURE: 98 F | RESPIRATION RATE: 16 BRPM | DIASTOLIC BLOOD PRESSURE: 78 MMHG | OXYGEN SATURATION: 100 % | SYSTOLIC BLOOD PRESSURE: 129 MMHG | HEART RATE: 66 BPM

## 2025-07-16 VITALS — OXYGEN SATURATION: 94 % | RESPIRATION RATE: 21 BRPM | HEART RATE: 71 BPM

## 2025-07-16 DIAGNOSIS — M89.9 DISORDER OF BONE, UNSPECIFIED: ICD-10-CM

## 2025-07-16 DIAGNOSIS — M71.9 BURSOPATHY, UNSPECIFIED: Chronic | ICD-10-CM

## 2025-07-16 DIAGNOSIS — Z90.09 ACQUIRED ABSENCE OF OTHER PART OF HEAD AND NECK: Chronic | ICD-10-CM

## 2025-07-16 PROCEDURE — 23410 REPAIR ROTATOR CUFF ACUTE: CPT | Mod: LT

## 2025-07-16 PROCEDURE — 23130 ACROMP/ACROMIONECTOMY PRTL: CPT | Mod: 59,LT

## 2025-07-16 PROCEDURE — 88311 DECALCIFY TISSUE: CPT | Mod: 26

## 2025-07-16 PROCEDURE — 23156 REMOVAL OF HUMERUS LESION: CPT | Mod: LT

## 2025-07-16 PROCEDURE — 88307 TISSUE EXAM BY PATHOLOGIST: CPT | Mod: 26

## 2025-07-16 RX ORDER — SODIUM CHLORIDE 9 G/1000ML
1000 INJECTION, SOLUTION INTRAVENOUS
Refills: 0 | Status: DISCONTINUED | OUTPATIENT
Start: 2025-07-16 | End: 2025-07-30

## 2025-07-16 RX ORDER — HYDROMORPHONE/SOD CHLOR,ISO/PF 2 MG/10 ML
1 SYRINGE (ML) INJECTION
Refills: 0 | Status: DISCONTINUED | OUTPATIENT
Start: 2025-07-16 | End: 2025-07-16

## 2025-07-16 RX ORDER — AMLODIPINE BESYLATE 10 MG/1
1 TABLET ORAL
Refills: 0 | DISCHARGE

## 2025-07-16 RX ORDER — NALOXONE HYDROCHLORIDE 0.4 MG/ML
1 INJECTION, SOLUTION INTRAMUSCULAR; INTRAVENOUS; SUBCUTANEOUS
Qty: 1 | Refills: 0
Start: 2025-07-16 | End: 2025-07-16

## 2025-07-16 RX ORDER — FENTANYL CITRATE-0.9 % NACL/PF 100MCG/2ML
25 SYRINGE (ML) INTRAVENOUS
Refills: 0 | Status: DISCONTINUED | OUTPATIENT
Start: 2025-07-16 | End: 2025-07-16

## 2025-07-16 RX ORDER — NALOXONE HYDROCHLORIDE 0.4 MG/ML
1 INJECTION, SOLUTION INTRAMUSCULAR; INTRAVENOUS; SUBCUTANEOUS
Qty: 1 | Refills: 0
Start: 2025-07-16

## 2025-07-16 RX ORDER — OXYCODONE HYDROCHLORIDE 30 MG/1
1 TABLET ORAL
Qty: 12 | Refills: 0
Start: 2025-07-16 | End: 2025-07-18

## 2025-07-16 RX ORDER — ACETAMINOPHEN 500 MG/5ML
975 LIQUID (ML) ORAL ONCE
Refills: 0 | Status: COMPLETED | OUTPATIENT
Start: 2025-07-16 | End: 2025-07-16

## 2025-07-16 RX ORDER — HYDROMORPHONE/SOD CHLOR,ISO/PF 2 MG/10 ML
0.5 SYRINGE (ML) INJECTION
Refills: 0 | Status: DISCONTINUED | OUTPATIENT
Start: 2025-07-16 | End: 2025-07-16

## 2025-07-16 RX ORDER — ONDANSETRON HCL/PF 4 MG/2 ML
4 VIAL (ML) INJECTION ONCE
Refills: 0 | Status: DISCONTINUED | OUTPATIENT
Start: 2025-07-16 | End: 2025-07-30

## 2025-07-16 RX ADMIN — Medication 975 MILLIGRAM(S): at 08:44

## 2025-07-16 RX ADMIN — SODIUM CHLORIDE 30 MILLILITER(S): 9 INJECTION, SOLUTION INTRAVENOUS at 08:35

## 2025-07-16 NOTE — ASU DISCHARGE PLAN (ADULT/PEDIATRIC) - CARE PROVIDER_API CALL
Tony Mcclure)  Orthopaedic Surgery  [address], [address]  [place] [address]  Phone: [phone]  Fax: [phone]  Follow Up Time: 2 weeks

## 2025-07-16 NOTE — ASU DISCHARGE PLAN (ADULT/PEDIATRIC) - ASU DC SPECIAL INSTRUCTIONSFT
POSTOP CARE:    1. Pain control: take tylenol and ibuprofen for pain, use oxycodone only as needed for severe pain.  2. Non weight bearing left upper extremity, sling for comfort.  3. Keep dressing clean, dry, and intact.  4. Apply ice to the shoulder.  5. Follow up with Dr. Mcclure in office in 2 weeks. Call office for appointment. POSTOP CARE:    1. Pain control: take tylenol and ibuprofen for pain, use oxycodone only as needed for severe pain.  2. Weight bearing as tolerated left upper extremity, sling for comfort. No gym or sports until follow up appointment.  3. Keep dressing clean, dry, and intact. You may get the dressing wet in 4 days but no tubs/pools or soaking.  4. Apply ice to the shoulder.  5. Follow up with Dr. Mcclure in office in 2 weeks. Call office for appointment.

## 2025-07-16 NOTE — ASU DISCHARGE PLAN (ADULT/PEDIATRIC) - FINANCIAL ASSISTANCE
[facility] provides services at a reduced cost to those who are determined to be eligible through [facility]’s financial assistance program. Information regarding [facility]’s financial assistance program can be found by going to [email or web address] or by calling [phone].

## 2025-07-16 NOTE — ASU DISCHARGE PLAN (ADULT/PEDIATRIC) - NURSING INSTRUCTIONS
DO NOT take any Tylenol (Acetaminophen) or narcotics containing Tylenol until after 3pm 7/16. You received Tylenol during your operation and it can cause damage to your liver if too much is taken within a 24 hour time period.

## 2025-07-24 ENCOUNTER — APPOINTMENT (OUTPATIENT)
Dept: PULMONOLOGY | Facility: CLINIC | Age: 55
End: 2025-07-24
Payer: COMMERCIAL

## 2025-07-24 VITALS
WEIGHT: 194 LBS | BODY MASS INDEX: 28.73 KG/M2 | HEART RATE: 76 BPM | DIASTOLIC BLOOD PRESSURE: 90 MMHG | HEIGHT: 69 IN | SYSTOLIC BLOOD PRESSURE: 142 MMHG | RESPIRATION RATE: 15 BRPM | OXYGEN SATURATION: 97 %

## 2025-07-24 DIAGNOSIS — G47.30 SLEEP APNEA, UNSPECIFIED: ICD-10-CM

## 2025-07-24 DIAGNOSIS — G47.33 OBSTRUCTIVE SLEEP APNEA (ADULT) (PEDIATRIC): ICD-10-CM

## 2025-07-24 PROCEDURE — G2211 COMPLEX E/M VISIT ADD ON: CPT | Mod: NC

## 2025-07-24 PROCEDURE — 99204 OFFICE O/P NEW MOD 45 MIN: CPT | Mod: GC

## 2025-07-25 ENCOUNTER — TRANSCRIPTION ENCOUNTER (OUTPATIENT)
Age: 55
End: 2025-07-25

## 2025-07-25 LAB — SURGICAL PATHOLOGY STUDY: SIGNIFICANT CHANGE UP

## 2025-07-28 PROBLEM — G47.30 SLEEP APNEA, UNSPECIFIED: Chronic | Status: ACTIVE | Noted: 2025-07-08

## 2025-07-28 PROBLEM — M89.8X9 OTHER SPECIFIED DISORDERS OF BONE, UNSPECIFIED SITE: Chronic | Status: ACTIVE | Noted: 2025-07-08

## 2025-07-31 ENCOUNTER — APPOINTMENT (OUTPATIENT)
Dept: ORTHOPEDIC SURGERY | Facility: CLINIC | Age: 55
End: 2025-07-31
Payer: COMMERCIAL

## 2025-07-31 PROCEDURE — 99024 POSTOP FOLLOW-UP VISIT: CPT

## 2025-08-25 ENCOUNTER — APPOINTMENT (OUTPATIENT)
Dept: SLEEP CENTER | Facility: CLINIC | Age: 55
End: 2025-08-25
Payer: COMMERCIAL

## 2025-08-25 PROCEDURE — 95800 SLP STDY UNATTENDED: CPT | Mod: 26

## 2025-09-05 ENCOUNTER — APPOINTMENT (OUTPATIENT)
Dept: ORTHOPEDIC SURGERY | Facility: CLINIC | Age: 55
End: 2025-09-05
Payer: COMMERCIAL

## 2025-09-05 ENCOUNTER — APPOINTMENT (OUTPATIENT)
Dept: PULMONOLOGY | Facility: CLINIC | Age: 55
End: 2025-09-05
Payer: COMMERCIAL

## 2025-09-05 VITALS — HEIGHT: 69 IN | BODY MASS INDEX: 28.73 KG/M2 | WEIGHT: 194 LBS

## 2025-09-05 DIAGNOSIS — M25.612 STIFFNESS OF LEFT SHOULDER, NOT ELSEWHERE CLASSIFIED: ICD-10-CM

## 2025-09-05 DIAGNOSIS — M75.42 IMPINGEMENT SYNDROME OF LEFT SHOULDER: ICD-10-CM

## 2025-09-05 DIAGNOSIS — M25.811 OTHER SPECIFIED JOINT DISORDERS, RIGHT SHOULDER: ICD-10-CM

## 2025-09-05 DIAGNOSIS — G47.33 OBSTRUCTIVE SLEEP APNEA (ADULT) (PEDIATRIC): ICD-10-CM

## 2025-09-05 PROCEDURE — 99203 OFFICE O/P NEW LOW 30 MIN: CPT | Mod: 95

## 2025-09-05 PROCEDURE — 73030 X-RAY EXAM OF SHOULDER: CPT | Mod: LT

## 2025-09-05 PROCEDURE — 99214 OFFICE O/P EST MOD 30 MIN: CPT | Mod: 25

## 2025-09-08 ENCOUNTER — APPOINTMENT (OUTPATIENT)
Dept: ORTHOPEDIC SURGERY | Facility: CLINIC | Age: 55
End: 2025-09-08

## 2025-09-08 VITALS — WEIGHT: 195 LBS | HEIGHT: 68 IN | BODY MASS INDEX: 29.55 KG/M2

## 2025-09-08 PROCEDURE — 99024 POSTOP FOLLOW-UP VISIT: CPT

## 2025-09-08 RX ORDER — KETOROLAC TROMETHAMINE 10 MG/1
10 TABLET, FILM COATED ORAL EVERY 6 HOURS
Qty: 20 | Refills: 0 | Status: ACTIVE | COMMUNITY
Start: 2025-09-08

## 2025-09-08 RX ORDER — GABAPENTIN 300 MG/1
300 CAPSULE ORAL 3 TIMES DAILY
Qty: 15 | Refills: 0 | Status: ACTIVE | COMMUNITY
Start: 2025-09-08 | End: 1900-01-01

## 2025-09-08 RX ORDER — OXYCODONE 5 MG/1
5 TABLET ORAL
Qty: 42 | Refills: 0 | Status: ACTIVE | COMMUNITY
Start: 2025-09-08 | End: 1900-01-01

## 2025-09-18 ENCOUNTER — APPOINTMENT (OUTPATIENT)
Age: 55
End: 2025-09-18
Payer: COMMERCIAL

## 2025-09-18 PROCEDURE — 20610 DRAIN/INJ JOINT/BURSA W/O US: CPT | Mod: RT

## 2025-09-18 PROCEDURE — 29825 SHO ARTHRS SRG LSS&RESCJ ADS: CPT | Mod: AS,RT

## 2025-09-18 PROCEDURE — 29823 SHO ARTHRS SRG XTNSV DBRDMT: CPT | Mod: AS,RT

## 2025-09-18 PROCEDURE — 29826 SHO ARTHRS SRG DECOMPRESSION: CPT | Mod: RT

## 2025-09-18 PROCEDURE — 29826 SHO ARTHRS SRG DECOMPRESSION: CPT | Mod: AS,RT

## 2025-09-18 PROCEDURE — 29821 SHO ARTHRS SRG COMPL SYNVCT: CPT | Mod: RT

## 2025-09-18 PROCEDURE — 23700 MNPJ ANES SHO JT FIXJ APRATS: CPT | Mod: AS,RT

## 2025-09-18 PROCEDURE — 29825 SHO ARTHRS SRG LSS&RESCJ ADS: CPT | Mod: RT

## 2025-09-18 PROCEDURE — 29821 SHO ARTHRS SRG COMPL SYNVCT: CPT | Mod: AS,RT

## 2025-09-18 PROCEDURE — 23700 MNPJ ANES SHO JT FIXJ APRATS: CPT | Mod: RT

## 2025-09-18 PROCEDURE — 29823 SHO ARTHRS SRG XTNSV DBRDMT: CPT | Mod: RT

## (undated) DEVICE — PACK LIJ BASIC ORTHO

## (undated) DEVICE — TOURNIQUET ESMARK 6"

## (undated) DEVICE — DRAPE COVER SNAP 36X30"

## (undated) DEVICE — SUT VICRYL PLUS 2-0 27" FS-1 UNDYED

## (undated) DEVICE — DRSG XEROFORM 5 X 9"

## (undated) DEVICE — DRAPE 3/4 SHEET 52X76"

## (undated) DEVICE — ELCTR BOVIE PENCIL SMOKE EVACUATION

## (undated) DEVICE — DRAPE IOBAN 23" X 23"

## (undated) DEVICE — MIDAS REX MR8 BALL FLUTED LG BORE 5MM X 9CM

## (undated) DEVICE — SUT VICRYL PLUS 0 18" OS-6 UNDYED (POP-OFF)

## (undated) DEVICE — WARMING BLANKET FULL ADULT

## (undated) DEVICE — SUT ETHILON 3-0 18" PS-1

## (undated) DEVICE — ELCTR BOVIE TIP NEEDLE INSULATED 2.8" EDGE

## (undated) DEVICE — SUT VICRYL 1 36" CTX UNDYED

## (undated) DEVICE — DRSG TELFA 3 X 8

## (undated) DEVICE — PREP CHLORAPREP HI-LITE ORANGE 26ML

## (undated) DEVICE — POSITIONER STRAP ARMBOARD VELCRO TS-30

## (undated) DEVICE — DRSG STOCKINETTE IMPERVIOUS XL 12 X 48"

## (undated) DEVICE — SUT VICRYL 3-0 18" PS-2 UNDYED

## (undated) DEVICE — DRSG COBAN 4"

## (undated) DEVICE — DRSG TEGADERM 3.5 X 6"

## (undated) DEVICE — SUT MONOCRYL 3-0 18" PS-2 UNDYED

## (undated) DEVICE — DRAPE U (BLUE) 60 X 60"

## (undated) DEVICE — VENODYNE/SCD SLEEVE CALF MEDIUM